# Patient Record
Sex: FEMALE | Race: WHITE | Employment: OTHER | URBAN - METROPOLITAN AREA
[De-identification: names, ages, dates, MRNs, and addresses within clinical notes are randomized per-mention and may not be internally consistent; named-entity substitution may affect disease eponyms.]

---

## 2021-05-14 NOTE — PROGRESS NOTES
Emile Segovia is a 79year old female who presents for     Establish care  Changing from Dr. Katerine Crawford at [facility] and wanted a doctor in [place].  Referred by another patient here–Rosalinda HARRY.     At present [place] [address] with [place] syndrome [address]   • Knee cartilage, torn, left 2020    saw Dr Cyndee Lucio. Did PT. • Left leg DVT ([address] 2014    treated w Xarelto x 1yr, then aspirin --Dr Rocío Mccauley and Dr Nerissa Camejo at [facility]   • Osteoarthritis    • Osteoporosis 2015    dx at [facility].  Calcium, Vit D. P COPD   • Hypertension Father    • Endocrine Disorder Mother         flip's disease dx'd in 57   • Other (fall) Mother          age 80.     • Other (wegener's disease) Brother    • Other (1 brother, 2 sons, 2 daughters) Other    • Other (brain tumo TSH. Pt then would like refill sent. Hypercholesterolemia-- on 12/16/20 in [place]. Check lipids. Pt following diet. Pt notes 2 statins in the past caused muscle pain. Consider crestor or zetia. Bruit L carotid--check carotid dopplers.    - chart-everywhere). Ret in 2 mo. Patient expresses understanding of above issues and plan.    Spent 95 min reviewing chart, history, exam, reviewing assessment/ plan, orders and completing documentation.    - CBC WITH DIFFERENTIAL WITH PLATELET; Futu

## 2021-05-17 ENCOUNTER — OFFICE VISIT (OUTPATIENT)
Dept: INTERNAL MEDICINE CLINIC | Facility: CLINIC | Age: 81
End: 2021-05-17
Payer: MEDICARE

## 2021-05-17 VITALS
HEART RATE: 72 BPM | HEIGHT: 61.5 IN | DIASTOLIC BLOOD PRESSURE: 66 MMHG | WEIGHT: 117 LBS | TEMPERATURE: 98 F | SYSTOLIC BLOOD PRESSURE: 138 MMHG | BODY MASS INDEX: 21.81 KG/M2

## 2021-05-17 DIAGNOSIS — Z85.3 HISTORY OF RIGHT BREAST CANCER: ICD-10-CM

## 2021-05-17 DIAGNOSIS — K58.9 IRRITABLE BOWEL SYNDROME, UNSPECIFIED TYPE: ICD-10-CM

## 2021-05-17 DIAGNOSIS — K21.9 GASTROESOPHAGEAL REFLUX DISEASE WITHOUT ESOPHAGITIS: ICD-10-CM

## 2021-05-17 DIAGNOSIS — Z76.89 ENCOUNTER TO ESTABLISH CARE: Primary | ICD-10-CM

## 2021-05-17 DIAGNOSIS — K76.0 FATTY LIVER: ICD-10-CM

## 2021-05-17 DIAGNOSIS — R09.89 BRUIT OF LEFT CAROTID ARTERY: ICD-10-CM

## 2021-05-17 DIAGNOSIS — E78.00 HYPERCHOLESTEROLEMIA: ICD-10-CM

## 2021-05-17 DIAGNOSIS — Z86.718 HISTORY OF DVT OF LOWER EXTREMITY: ICD-10-CM

## 2021-05-17 DIAGNOSIS — E55.9 VITAMIN D DEFICIENCY: ICD-10-CM

## 2021-05-17 DIAGNOSIS — E03.9 HYPOTHYROIDISM, UNSPECIFIED TYPE: ICD-10-CM

## 2021-05-17 PROBLEM — Z87.09 HISTORY OF ASTHMA: Status: ACTIVE | Noted: 2021-05-17

## 2021-05-17 PROCEDURE — G2212 PROLONG OUTPT/OFFICE VIS: HCPCS | Performed by: INTERNAL MEDICINE

## 2021-05-17 PROCEDURE — 99205 OFFICE O/P NEW HI 60 MIN: CPT | Performed by: INTERNAL MEDICINE

## 2021-05-17 RX ORDER — ASCORBIC ACID 250 MG
TABLET,CHEWABLE ORAL
Qty: 90 TABLET | Refills: 0 | COMMUNITY
Start: 2021-05-17 | End: 2021-06-02

## 2021-05-17 RX ORDER — ASPIRIN 325 MG
325 TABLET ORAL DAILY
Refills: 0 | COMMUNITY
Start: 2021-05-17 | End: 2021-06-02

## 2021-05-17 RX ORDER — OMEGA-3 FATTY ACIDS/FISH OIL 300-1000MG
CAPSULE ORAL DAILY
Qty: 90 CAPSULE | Refills: 0 | COMMUNITY
Start: 2021-05-17 | End: 2021-06-02

## 2021-05-17 RX ORDER — LEVOTHYROXINE SODIUM 0.07 MG/1
75 TABLET ORAL
Refills: 0 | COMMUNITY
Start: 2021-05-17 | End: 2021-05-19

## 2021-05-17 RX ORDER — HYDROCHLOROTHIAZIDE 25 MG/1
25 TABLET ORAL DAILY
Refills: 0 | COMMUNITY
Start: 2021-05-17 | End: 2021-05-19 | Stop reason: ALTCHOICE

## 2021-05-17 NOTE — PATIENT INSTRUCTIONS
Do ultrasound of carotid arteries (dopplers). Fasting lab tests. Please get records from your prior PCP.

## 2021-05-18 ENCOUNTER — LAB ENCOUNTER (OUTPATIENT)
Dept: LAB | Facility: HOSPITAL | Age: 81
End: 2021-05-18
Attending: INTERNAL MEDICINE
Payer: MEDICARE

## 2021-05-18 ENCOUNTER — TELEPHONE (OUTPATIENT)
Dept: INTERNAL MEDICINE CLINIC | Facility: CLINIC | Age: 81
End: 2021-05-18

## 2021-05-18 DIAGNOSIS — E78.00 HYPERCHOLESTEROLEMIA: ICD-10-CM

## 2021-05-18 DIAGNOSIS — E55.9 VITAMIN D DEFICIENCY: ICD-10-CM

## 2021-05-18 PROCEDURE — 80053 COMPREHEN METABOLIC PANEL: CPT

## 2021-05-18 PROCEDURE — 85025 COMPLETE CBC W/AUTO DIFF WBC: CPT

## 2021-05-18 PROCEDURE — 82306 VITAMIN D 25 HYDROXY: CPT

## 2021-05-18 PROCEDURE — 36415 COLL VENOUS BLD VENIPUNCTURE: CPT

## 2021-05-18 PROCEDURE — 84443 ASSAY THYROID STIM HORMONE: CPT

## 2021-05-18 PROCEDURE — 80061 LIPID PANEL: CPT

## 2021-05-18 NOTE — TELEPHONE ENCOUNTER
Patient stopped in today and signed the completed release of information form left at the .      Release of Information was then faxed to the requested medical records number for [facility] at [phone]    Con

## 2021-05-18 NOTE — TELEPHONE ENCOUNTER
Called patient who will come into office and sign PHI form for [place] [id number] records. Unable to access via [facility]. Left form at -pt will sign this afternoon.

## 2021-05-19 ENCOUNTER — TELEPHONE (OUTPATIENT)
Dept: INTERNAL MEDICINE CLINIC | Facility: CLINIC | Age: 81
End: 2021-05-19

## 2021-05-19 DIAGNOSIS — E87.6 LOW SERUM POTASSIUM: Primary | ICD-10-CM

## 2021-05-19 RX ORDER — TRIAMTERENE AND HYDROCHLOROTHIAZIDE 37.5; 25 MG/1; MG/1
1 CAPSULE ORAL DAILY
Qty: 90 CAPSULE | Refills: 3 | Status: SHIPPED | OUTPATIENT
Start: 2021-05-19 | End: 2021-05-25

## 2021-05-19 RX ORDER — LEVOTHYROXINE SODIUM 0.07 MG/1
75 TABLET ORAL
Qty: 90 TABLET | Refills: 3 | Status: SHIPPED | OUTPATIENT
Start: 2021-05-19

## 2021-05-19 NOTE — TELEPHONE ENCOUNTER
To nursing,   please tell patient lab results are overall okay except     1) potassium level is a little low at 3.2.   Change hydrochlorothiazide 25 mg daily to an equivalent medication that is potassium sparing substitute called triamterene–hydrochlorothia

## 2021-05-19 NOTE — TELEPHONE ENCOUNTER
To nursing, please tell patient vitamin D level is on the high side at 78.9. In this range, she does not need vitamin D supplements and can stop vitamin D that she takes. Thanks.

## 2021-05-24 ENCOUNTER — PATIENT MESSAGE (OUTPATIENT)
Dept: INTERNAL MEDICINE CLINIC | Facility: CLINIC | Age: 81
End: 2021-05-24

## 2021-05-25 ENCOUNTER — TELEPHONE (OUTPATIENT)
Dept: INTERNAL MEDICINE CLINIC | Facility: CLINIC | Age: 81
End: 2021-05-25

## 2021-05-25 RX ORDER — POTASSIUM CHLORIDE 750 MG/1
10 TABLET, FILM COATED, EXTENDED RELEASE ORAL DAILY
Qty: 90 TABLET | Refills: 3 | Status: SHIPPED | OUTPATIENT
Start: 2021-05-25 | End: 2021-06-02

## 2021-05-25 RX ORDER — HYDROCHLOROTHIAZIDE 25 MG/1
25 TABLET ORAL DAILY
Qty: 90 TABLET | Refills: 3 | Status: SHIPPED | OUTPATIENT
Start: 2021-05-25 | End: 2021-06-02

## 2021-05-25 NOTE — TELEPHONE ENCOUNTER
----- Message from Trang Maldonado sent at 5/24/2021  6:39 PM CDT -----  Regarding: Prescription Question  Contact: [phone]   at my recent new patient visit to you a new daily prescription was begun,   triamiterene 37.5mg/[id number], instead of the com

## 2021-05-25 NOTE — TELEPHONE ENCOUNTER
To Dr. Renee Delgadillo - see below. Spoke with pt who confirmed message below, said she is discouraged as she has had difficult problems/reactions with BP meds in the past.  Did not realized this has BP reducing component.   She does understand this was changed

## 2021-05-25 NOTE — TELEPHONE ENCOUNTER
To nursing, please tell patient:  1) stop triamterene–HCTZ since she is not tolerating it. 2) go back to hydrochlorothiazide 25 mg daily.   3) since potassium level is low (K3.2 on 5/18/21), take a small dose of potassium along with the hydrochlorothiazide

## 2021-05-25 NOTE — TELEPHONE ENCOUNTER
Spoke to patient and relayed MD message and instructions, patient verbalizes understanding and agrees with plan. Pended rx's sent to patient pharmacy. She denies any questions at this time.

## 2021-05-28 ENCOUNTER — HOSPITAL ENCOUNTER (OUTPATIENT)
Dept: ULTRASOUND IMAGING | Facility: HOSPITAL | Age: 81
Discharge: HOME | End: 2021-05-28
Attending: INTERNAL MEDICINE
Payer: MEDICARE

## 2021-05-28 ENCOUNTER — TELEPHONE (OUTPATIENT)
Dept: INTERNAL MEDICINE CLINIC | Facility: CLINIC | Age: 81
End: 2021-05-28

## 2021-05-28 DIAGNOSIS — R09.89 BRUIT OF LEFT CAROTID ARTERY: ICD-10-CM

## 2021-05-28 DIAGNOSIS — I49.9 CARDIAC ARRHYTHMIA, UNSPECIFIED CARDIAC ARRHYTHMIA TYPE: Primary | ICD-10-CM

## 2021-05-28 PROCEDURE — 93880 EXTRACRANIAL BILAT STUDY: CPT | Performed by: INTERNAL MEDICINE

## 2021-05-28 NOTE — TELEPHONE ENCOUNTER
To nursing, please tell pt personally. 5/28/21--carotid dopplers--results show no significant narrowing. Incidentally the radiologist \"note is made of cardiac arrhythmia\". This is not specified what it is.  I recom she check EKG--order entered--she ca

## 2021-06-02 ENCOUNTER — OFFICE VISIT (OUTPATIENT)
Dept: INTERNAL MEDICINE CLINIC | Facility: CLINIC | Age: 81
End: 2021-06-02
Payer: MEDICARE

## 2021-06-02 VITALS
HEART RATE: 60 BPM | DIASTOLIC BLOOD PRESSURE: 68 MMHG | WEIGHT: 109 LBS | TEMPERATURE: 98 F | BODY MASS INDEX: 20.32 KG/M2 | HEIGHT: 61.5 IN | SYSTOLIC BLOOD PRESSURE: 148 MMHG

## 2021-06-02 DIAGNOSIS — E87.6 LOW SERUM POTASSIUM: ICD-10-CM

## 2021-06-02 DIAGNOSIS — I49.3 PVC'S (PREMATURE VENTRICULAR CONTRACTIONS): ICD-10-CM

## 2021-06-02 DIAGNOSIS — K58.9 IRRITABLE BOWEL SYNDROME, UNSPECIFIED TYPE: Primary | ICD-10-CM

## 2021-06-02 DIAGNOSIS — I49.9 CARDIAC ARRHYTHMIA, UNSPECIFIED CARDIAC ARRHYTHMIA TYPE: ICD-10-CM

## 2021-06-02 PROCEDURE — 99215 OFFICE O/P EST HI 40 MIN: CPT | Performed by: INTERNAL MEDICINE

## 2021-06-02 PROCEDURE — 93000 ELECTROCARDIOGRAM COMPLETE: CPT | Performed by: INTERNAL MEDICINE

## 2021-06-02 RX ORDER — WHEAT DEXTRIN 3 G/3.8 G
POWDER (GRAM) ORAL AS NEEDED
Refills: 0 | COMMUNITY
Start: 2021-06-02

## 2021-06-02 NOTE — PATIENT INSTRUCTIONS
See GI specialist--Dr Cohen//Clare CHUN. Add benefiber 1 tsp daily in juice or water. Stop hydrochlorothiazide. Check lab test in 1-2 wks--potassium and magnesium levels.    Do echocardiogram.

## 2021-06-02 NOTE — PROGRESS NOTES
Marisel Das is a 79year old female who presents for     GI issues:  \"I've had them for years. As I get older, they take longer to get over. \"  Onset 1 wk ago, \"gas pain, explosion of feces, mushy every morning. \"  One stool a day--\"once I expel it in with erosions, punctate ulcers prepyloric area and duodenal bulb   • GERD (gastroesophageal reflux disease)    • Hypercholesterolemia    • Hypothyroidism 2000 or before   • Irritable bowel syndrome 2000   • Knee cartilage, torn, left 2020    saw Dr Laisha Zaldivar (H pylori negative), esophagitis      Family History   Problem Relation Age of Onset   • Heart Disease Father         coronary artery disease-- age 80   • Lung Disorder Father         COPD   • Hypertension Father    • Endocrine Disorder Mother Await response to benefiber --and see GI. Of note--in old records:  CT abd pelvis 5/20/20 at [facility]-no acute abn. Hepatic cysts and 12 mm focus in R lobe liver.    MRI  Liver w w/o contrast 6/13/20-[facility]--focal steatosis account for CT abn. consderable atroph Dr Justine Presley. Hx DVT L leg--in 2014--took xarelto for 1 yr. Now took  mg daily until 6/2/21 visit. Was taking hydrochlorothiazide 25 mg daily for past swelling. K3.2 on 5/18/21–gave trial Dyazide instead of HCTZ.   Patient did not tolerate dya

## 2021-06-17 ENCOUNTER — HOSPITAL ENCOUNTER (OUTPATIENT)
Dept: CV DIAGNOSTICS | Age: 81
Discharge: HOME | End: 2021-06-17
Attending: INTERNAL MEDICINE
Payer: MEDICARE

## 2021-06-17 DIAGNOSIS — I49.3 PVC'S (PREMATURE VENTRICULAR CONTRACTIONS): ICD-10-CM

## 2021-06-17 PROCEDURE — 93306 TTE W/DOPPLER COMPLETE: CPT | Performed by: INTERNAL MEDICINE

## 2021-06-18 ENCOUNTER — LAB ENCOUNTER (OUTPATIENT)
Dept: LAB | Facility: HOSPITAL | Age: 81
End: 2021-06-18
Attending: INTERNAL MEDICINE
Payer: MEDICARE

## 2021-06-18 ENCOUNTER — TELEPHONE (OUTPATIENT)
Dept: INTERNAL MEDICINE CLINIC | Facility: CLINIC | Age: 81
End: 2021-06-18

## 2021-06-18 DIAGNOSIS — I49.3 PVC'S (PREMATURE VENTRICULAR CONTRACTIONS): ICD-10-CM

## 2021-06-18 DIAGNOSIS — E87.6 LOW SERUM POTASSIUM: ICD-10-CM

## 2021-06-18 PROCEDURE — 84132 ASSAY OF SERUM POTASSIUM: CPT

## 2021-06-18 PROCEDURE — 36415 COLL VENOUS BLD VENIPUNCTURE: CPT

## 2021-06-18 PROCEDURE — 83735 ASSAY OF MAGNESIUM: CPT

## 2021-06-18 NOTE — TELEPHONE ENCOUNTER
I sent patient results note email:  Philly Boss, your magnesium and potassium levels done 3/18/21 are normal.  Please see me 7/19/21 as planned. Dr Stephanie Rodríguez. Note to self–K3.6, mag 2.3 on 6/18/21.

## 2021-07-16 NOTE — PROGRESS NOTES
Farida Peñaloza is a 79year old female who presents for     Check at 1.5 months–    GI issues f/u  \"I discontinued the aspirin 325 mg I was taking for prevention of blood clot.   As time has gone on, the pain (abd) has resolved and irritation from foods is normal.   • Wrist fracture last 2019    R wrist fracture \"five times\"--casted Dr Loulou Cai      Past Surgical History:   Procedure Laterality Date   • CATARACT EXTRACTION Bilateral 2014    Dr Encarnacion Opitz   • [facility]  [facility] 28 [facility]   • Ulcerative Colitis Daughter       Social History:   Social History    Tobacco Use      Smoking status: Never Smoker      Smokeless tobacco: Never Used    Alcohol use: Yes      Comment: 1-2 drinks a month    Drug use: Not on consderable atrophy pancreas. Colonoscopy 10/20/14–Dr. Deon Vasquez–diverticulosis, volodymyr polyp, random colon bx's normal–next at 6 yrs. he ref for pelvic floor therapy for fecal incontinence-pt noted at 5/17/21 visit that this resolved.  She declines furt this resolved. She declines further cscopies. Mammogram–11/2020 in [place]--Dr De León. Was ok per pt. Dexa--hx osteoporosis on dexa--pt declines rx, therefore forego dexa. Johnnie Felder.   Vaccines–Tdap 5/9/12, Pneumovax–12/2020 in [place] [address]

## 2021-07-19 ENCOUNTER — OFFICE VISIT (OUTPATIENT)
Dept: INTERNAL MEDICINE CLINIC | Facility: CLINIC | Age: 81
End: 2021-07-19
Payer: MEDICARE

## 2021-07-19 VITALS
HEART RATE: 72 BPM | HEIGHT: 61.5 IN | DIASTOLIC BLOOD PRESSURE: 70 MMHG | WEIGHT: 119 LBS | SYSTOLIC BLOOD PRESSURE: 130 MMHG | TEMPERATURE: 98 F | BODY MASS INDEX: 22.18 KG/M2

## 2021-07-19 DIAGNOSIS — I49.3 PVC'S (PREMATURE VENTRICULAR CONTRACTIONS): ICD-10-CM

## 2021-07-19 DIAGNOSIS — K29.70 GASTRITIS WITHOUT BLEEDING, UNSPECIFIED CHRONICITY, UNSPECIFIED GASTRITIS TYPE: Primary | ICD-10-CM

## 2021-07-19 DIAGNOSIS — K58.9 IRRITABLE BOWEL SYNDROME, UNSPECIFIED TYPE: ICD-10-CM

## 2021-07-19 DIAGNOSIS — I83.93 ASYMPTOMATIC VARICOSE VEINS OF BOTH LOWER EXTREMITIES: ICD-10-CM

## 2021-07-19 PROCEDURE — 99214 OFFICE O/P EST MOD 30 MIN: CPT | Performed by: INTERNAL MEDICINE

## 2021-07-19 RX ORDER — ASPIRIN 81 MG/1
81 TABLET ORAL DAILY
Qty: 1 TABLET | Refills: 0 | COMMUNITY
Start: 2021-07-19 | End: 2021-08-09

## 2021-08-02 ENCOUNTER — MED REC SCAN ONLY (OUTPATIENT)
Dept: INTERNAL MEDICINE CLINIC | Facility: CLINIC | Age: 81
End: 2021-08-02

## 2021-08-04 ENCOUNTER — APPOINTMENT (OUTPATIENT)
Dept: ULTRASOUND IMAGING | Facility: HOSPITAL | Age: 81
End: 2021-08-04
Payer: MEDICARE

## 2021-08-04 ENCOUNTER — HOSPITAL ENCOUNTER (EMERGENCY)
Facility: HOSPITAL | Age: 81
Discharge: HOME | End: 2021-08-04
Payer: MEDICARE

## 2021-08-04 VITALS
RESPIRATION RATE: 18 BRPM | OXYGEN SATURATION: 98 % | TEMPERATURE: 98 F | SYSTOLIC BLOOD PRESSURE: 177 MMHG | HEART RATE: 78 BPM | DIASTOLIC BLOOD PRESSURE: 62 MMHG

## 2021-08-04 DIAGNOSIS — M79.604 RIGHT LEG PAIN: Primary | ICD-10-CM

## 2021-08-04 PROCEDURE — 93971 EXTREMITY STUDY: CPT

## 2021-08-04 PROCEDURE — 99284 EMERGENCY DEPT VISIT MOD MDM: CPT

## 2021-08-04 NOTE — ED INITIAL ASSESSMENT (HPI)
The patient who reports a left leg DVT about 6 years ago states that she has stopped taking 325 mg daily aspirin about 2.5 months ago due to abdominal discomfort and now reports right upper thigh to femoral leg pain that is similar in presentation to her l

## 2021-08-04 NOTE — ED PROVIDER NOTES
Patient Seen in: [facility] AND [facility] Emergency Department      History   Patient presents with:  Leg Pain    Stated Complaint: possible dvt     HPI/Subjective:   HPI    Patient is a 20-year-old female that complains of right thigh pain.   She states she had Smith--diverticulosis, hemorrhoids, polyp--lost, recommended repeat colon 5 years   • COLONOSCOPY,BIOPSY N/A 10/20/2014    Procedure: ESOPHAGOGASTRODUODENOSCOPY, COLONOSCOPY, POSSIBLE BIOPSY, POSSIBLE POLYPECTOMY 19100,15026;  Surgeon: Xavi Lopes MD; Physical Exam    Constitutional: Oriented to person, place, and time. Appears well-developed and well-nourished. HEENT:   Head: Normocephalic and atraumatic.    Right Ear: External ear normal.   Left Ear: External ear normal.   Nose: Nose normal. days      We recommend that you schedule follow up care with a primary care provider within the next three months to obtain basic health screening including reassessment of your blood pressure.       Medications Prescribed:  Discharge Medication List as of

## 2021-08-09 ENCOUNTER — TELEPHONE (OUTPATIENT)
Dept: INTERNAL MEDICINE CLINIC | Facility: CLINIC | Age: 81
End: 2021-08-09

## 2021-08-09 ENCOUNTER — OFFICE VISIT (OUTPATIENT)
Dept: INTERNAL MEDICINE CLINIC | Facility: CLINIC | Age: 81
End: 2021-08-09
Payer: MEDICARE

## 2021-08-09 VITALS
OXYGEN SATURATION: 98 % | HEIGHT: 61.5 IN | BODY MASS INDEX: 22.03 KG/M2 | WEIGHT: 118.19 LBS | HEART RATE: 61 BPM | DIASTOLIC BLOOD PRESSURE: 66 MMHG | TEMPERATURE: 98 F | SYSTOLIC BLOOD PRESSURE: 136 MMHG

## 2021-08-09 DIAGNOSIS — M79.651 RIGHT THIGH PAIN: Primary | ICD-10-CM

## 2021-08-09 DIAGNOSIS — I49.9 CARDIAC ARRHYTHMIA, UNSPECIFIED CARDIAC ARRHYTHMIA TYPE: ICD-10-CM

## 2021-08-09 PROCEDURE — 99213 OFFICE O/P EST LOW 20 MIN: CPT | Performed by: INTERNAL MEDICINE

## 2021-08-09 PROCEDURE — 93000 ELECTROCARDIOGRAM COMPLETE: CPT | Performed by: INTERNAL MEDICINE

## 2021-08-09 NOTE — PROGRESS NOTES
Saeed Hinkle is a 79year old female who presents for     Seen in ER 8/4/21 with R thigh pain. \"I woke up b/c of it at 1 in the morning with sharp pain (in the R anterior thigh) and I couldn't get back to sleep.  I thought it was b/c I had been in one p ESOPHAGOGASTRODUODENOSCOPY, COLONOSCOPY, POSSIBLE BIOPSY, POSSIBLE POLYPECTOMY 19900,79160;  Surgeon: Xavi Lopes MD;  Location: [address] [place] [address]   • LUMPECTOMY RIGHT  2014    Dr Richey Enter Cross R*    HIVES           EXAM:   /66   Pulse 61   Temp 98.3 °F (36.8 °C)   Ht 5' 1.5\" (1.562 m)   Wt 118 lb 3.2 oz (53.6 kg)   SpO2 98%   BMI 21.97 kg/m²       Wt Readings from Last 6 Encounters:  08/09/21 : 118 lb 3.2 oz (53.6 kg)  07/19/21 : 11 note--in old records:  CT abd pelvis 5/20/20 at [facility]-no acute abn. Hepatic cysts and 12 mm focus in R lobe liver. MRI  Liver w w/o contrast 6/13/20-[facility]--focal steatosis account for CT abn. consderable atrophy pancreas.    Colonoscopy 10/20/14–Dr. Manuel Brownlee at 6 yrs. he ref for pelvic floor therapy for fecal incontinence-pt notes at 5/17/21 visit that this resolved. She declines further cscopies. Mammo–11/2020 in [place]--Dr Ward. Was ok per pt.    Dexa--hx osteoporosis on dexa--pt declines rx, therefore for

## 2022-03-31 ENCOUNTER — LAB ENCOUNTER (OUTPATIENT)
Dept: LAB | Facility: HOSPITAL | Age: 82
End: 2022-03-31
Attending: INTERNAL MEDICINE
Payer: MEDICARE

## 2022-03-31 DIAGNOSIS — E03.9 ACQUIRED HYPOTHYROIDISM: ICD-10-CM

## 2022-03-31 DIAGNOSIS — E78.00 PURE HYPERCHOLESTEROLEMIA: ICD-10-CM

## 2022-03-31 DIAGNOSIS — I51.9 MYXEDEMA HEART DISEASE: ICD-10-CM

## 2022-03-31 DIAGNOSIS — E03.9 MYXEDEMA HEART DISEASE: ICD-10-CM

## 2022-03-31 DIAGNOSIS — R07.89 OTHER CHEST PAIN: ICD-10-CM

## 2022-03-31 DIAGNOSIS — Z86.718 PERSONAL HISTORY OF VENOUS THROMBOSIS AND EMBOLISM: Primary | ICD-10-CM

## 2022-03-31 LAB
ALBUMIN SERPL-MCNC: 3.5 G/DL (ref 3.4–5)
ALBUMIN/GLOB SERPL: 1 {RATIO} (ref 1–2)
ALP LIVER SERPL-CCNC: 63 U/L
ALT SERPL-CCNC: 24 U/L
ANION GAP SERPL CALC-SCNC: 7 MMOL/L (ref 0–18)
AST SERPL-CCNC: 23 U/L (ref 15–37)
BASOPHILS # BLD AUTO: 0.05 X10(3) UL (ref 0–0.2)
BASOPHILS NFR BLD AUTO: 1 %
BILIRUB SERPL-MCNC: 1.3 MG/DL (ref 0.1–2)
BUN BLD-MCNC: 24 MG/DL (ref 7–18)
CALCIUM BLD-MCNC: 9.6 MG/DL (ref 8.5–10.1)
CHLORIDE SERPL-SCNC: 109 MMOL/L (ref 98–112)
CHOLEST SERPL-MCNC: 248 MG/DL (ref ?–200)
CO2 SERPL-SCNC: 27 MMOL/L (ref 21–32)
CREAT BLD-MCNC: 1.07 MG/DL
DEPRECATED RDW RBC AUTO: 44.5 FL (ref 35.1–46.3)
EOSINOPHIL # BLD AUTO: 0.1 X10(3) UL (ref 0–0.7)
EOSINOPHIL NFR BLD AUTO: 2.1 %
ERYTHROCYTE [DISTWIDTH] IN BLOOD BY AUTOMATED COUNT: 13 % (ref 11–15)
FASTING PATIENT LIPID ANSWER: YES
GLOBULIN PLAS-MCNC: 3.5 G/DL (ref 2.8–4.4)
GLUCOSE BLD-MCNC: 88 MG/DL (ref 70–99)
HCT VFR BLD AUTO: 48.4 %
HDLC SERPL-MCNC: 69 MG/DL (ref 40–59)
HGB BLD-MCNC: 15.8 G/DL
IMM GRANULOCYTES # BLD AUTO: 0.01 X10(3) UL (ref 0–1)
IMM GRANULOCYTES NFR BLD: 0.2 %
LDLC SERPL CALC-MCNC: 160 MG/DL (ref ?–100)
LYMPHOCYTES # BLD AUTO: 2 X10(3) UL (ref 1–4)
LYMPHOCYTES NFR BLD AUTO: 41.1 %
MCH RBC QN AUTO: 30.4 PG (ref 26–34)
MCHC RBC AUTO-ENTMCNC: 32.6 G/DL (ref 31–37)
MCV RBC AUTO: 93.1 FL
MONOCYTES # BLD AUTO: 0.55 X10(3) UL (ref 0.1–1)
MONOCYTES NFR BLD AUTO: 11.3 %
NEUTROPHILS # BLD AUTO: 2.16 X10 (3) UL (ref 1.5–7.7)
NEUTROPHILS # BLD AUTO: 2.16 X10(3) UL (ref 1.5–7.7)
NEUTROPHILS NFR BLD AUTO: 44.3 %
NONHDLC SERPL-MCNC: 179 MG/DL (ref ?–130)
OSMOLALITY SERPL CALC.SUM OF ELEC: 299 MOSM/KG (ref 275–295)
PLATELET # BLD AUTO: 167 10(3)UL (ref 150–450)
POTASSIUM SERPL-SCNC: 4 MMOL/L (ref 3.5–5.1)
PROT SERPL-MCNC: 7 G/DL (ref 6.4–8.2)
RBC # BLD AUTO: 5.2 X10(6)UL
SODIUM SERPL-SCNC: 143 MMOL/L (ref 136–145)
T4 FREE SERPL-MCNC: 1.5 NG/DL (ref 0.8–1.7)
TRIGL SERPL-MCNC: 108 MG/DL (ref 30–149)
TSI SER-ACNC: 4.07 MIU/ML (ref 0.36–3.74)
VLDLC SERPL CALC-MCNC: 21 MG/DL (ref 0–30)
WBC # BLD AUTO: 4.9 X10(3) UL (ref 4–11)

## 2022-03-31 PROCEDURE — 80053 COMPREHEN METABOLIC PANEL: CPT

## 2022-03-31 PROCEDURE — 84443 ASSAY THYROID STIM HORMONE: CPT

## 2022-03-31 PROCEDURE — 85025 COMPLETE CBC W/AUTO DIFF WBC: CPT

## 2022-03-31 PROCEDURE — 80061 LIPID PANEL: CPT

## 2022-03-31 PROCEDURE — 36415 COLL VENOUS BLD VENIPUNCTURE: CPT

## 2022-03-31 PROCEDURE — 84439 ASSAY OF FREE THYROXINE: CPT

## 2022-04-01 ENCOUNTER — OFFICE VISIT (OUTPATIENT)
Dept: INTERNAL MEDICINE CLINIC | Facility: CLINIC | Age: 82
End: 2022-04-01
Payer: MEDICARE

## 2022-04-01 VITALS
DIASTOLIC BLOOD PRESSURE: 68 MMHG | WEIGHT: 121 LBS | SYSTOLIC BLOOD PRESSURE: 138 MMHG | HEART RATE: 56 BPM | TEMPERATURE: 98 F | HEIGHT: 61.5 IN | BODY MASS INDEX: 22.55 KG/M2

## 2022-04-01 DIAGNOSIS — K21.9 GASTROESOPHAGEAL REFLUX DISEASE WITHOUT ESOPHAGITIS: ICD-10-CM

## 2022-04-01 DIAGNOSIS — E78.00 HYPERCHOLESTEROLEMIA: ICD-10-CM

## 2022-04-01 DIAGNOSIS — I83.93 ASYMPTOMATIC VARICOSE VEINS OF BOTH LOWER EXTREMITIES: ICD-10-CM

## 2022-04-01 DIAGNOSIS — E03.9 HYPOTHYROIDISM, UNSPECIFIED TYPE: ICD-10-CM

## 2022-04-01 DIAGNOSIS — K58.9 IRRITABLE BOWEL SYNDROME, UNSPECIFIED TYPE: ICD-10-CM

## 2022-04-01 DIAGNOSIS — Z00.00 MEDICARE ANNUAL WELLNESS VISIT, SUBSEQUENT: Primary | ICD-10-CM

## 2022-04-01 PROCEDURE — 99214 OFFICE O/P EST MOD 30 MIN: CPT | Performed by: INTERNAL MEDICINE

## 2022-04-01 PROCEDURE — G0439 PPPS, SUBSEQ VISIT: HCPCS | Performed by: INTERNAL MEDICINE

## 2022-04-01 RX ORDER — LEVOTHYROXINE SODIUM 0.07 MG/1
75 TABLET ORAL
Qty: 90 TABLET | Refills: 3 | Status: SHIPPED | OUTPATIENT
Start: 2022-04-01

## 2022-04-01 NOTE — PATIENT INSTRUCTIONS
4th covid shot is recommended. Please fill out the power of  of healthcare form and bring a copy here.

## 2022-04-07 ENCOUNTER — IMMUNIZATION (OUTPATIENT)
Dept: LAB | Age: 82
End: 2022-04-07
Attending: EMERGENCY MEDICINE
Payer: MEDICARE

## 2022-04-07 DIAGNOSIS — Z23 NEED FOR VACCINATION: Primary | ICD-10-CM

## 2022-04-07 PROCEDURE — 0054A SARSCOV2 VAC 30MCG TRS SUCR: CPT

## 2022-05-20 ENCOUNTER — OFFICE VISIT (OUTPATIENT)
Dept: INTERNAL MEDICINE CLINIC | Facility: CLINIC | Age: 82
End: 2022-05-20
Payer: MEDICARE

## 2022-05-20 ENCOUNTER — TELEPHONE (OUTPATIENT)
Dept: INTERNAL MEDICINE CLINIC | Facility: CLINIC | Age: 82
End: 2022-05-20

## 2022-05-20 VITALS
DIASTOLIC BLOOD PRESSURE: 60 MMHG | OXYGEN SATURATION: 95 % | BODY MASS INDEX: 22.92 KG/M2 | WEIGHT: 123 LBS | SYSTOLIC BLOOD PRESSURE: 138 MMHG | HEIGHT: 61.5 IN | TEMPERATURE: 98 F | HEART RATE: 72 BPM

## 2022-05-20 DIAGNOSIS — Z01.812 PRE-PROCEDURE LAB EXAM: ICD-10-CM

## 2022-05-20 DIAGNOSIS — R06.00 DOE (DYSPNEA ON EXERTION): ICD-10-CM

## 2022-05-20 DIAGNOSIS — J45.20 MILD INTERMITTENT ASTHMA WITHOUT COMPLICATION: Primary | ICD-10-CM

## 2022-05-20 RX ORDER — LEVALBUTEROL TARTRATE 45 UG/1
1-2 AEROSOL, METERED ORAL EVERY 4 HOURS PRN
Qty: 1 EACH | Refills: 5 | Status: SHIPPED | OUTPATIENT
Start: 2022-05-20

## 2022-05-20 NOTE — TELEPHONE ENCOUNTER
To nursing, please tell patient   Dr. Rebeca Lebron office faxed the ambulatory heart monitor report from April 2022. Results showed 1 episode of atrial fibrillation and some premature beats called PVCs --17% of her beats. Keep her appointment with Dr. Roberta Miranda next month as planned. In the meantime, ask her to check with Dr. Rebeca Lebron office/his nurse if they recommend any specific medicine changes due to the 1 episode of atrial fibrillation.   Thanks

## 2022-05-23 ENCOUNTER — LAB ENCOUNTER (OUTPATIENT)
Dept: LAB | Facility: HOSPITAL | Age: 82
End: 2022-05-23
Attending: INTERNAL MEDICINE
Payer: MEDICARE

## 2022-05-23 DIAGNOSIS — Z01.812 PRE-PROCEDURE LAB EXAM: ICD-10-CM

## 2022-05-23 LAB — SARS-COV-2 RNA RESP QL NAA+PROBE: NOT DETECTED

## 2022-05-25 ENCOUNTER — TELEPHONE (OUTPATIENT)
Dept: INTERNAL MEDICINE CLINIC | Facility: CLINIC | Age: 82
End: 2022-05-25

## 2022-05-26 ENCOUNTER — TELEPHONE (OUTPATIENT)
Dept: INTERNAL MEDICINE CLINIC | Facility: CLINIC | Age: 82
End: 2022-05-26

## 2022-05-26 ENCOUNTER — HOSPITAL ENCOUNTER (OUTPATIENT)
Dept: CV DIAGNOSTICS | Facility: HOSPITAL | Age: 82
Discharge: HOME | End: 2022-05-26
Attending: INTERNAL MEDICINE
Payer: MEDICARE

## 2022-05-26 ENCOUNTER — HOSPITAL ENCOUNTER (OUTPATIENT)
Dept: RESPIRATORY THERAPY | Facility: HOSPITAL | Age: 82
Discharge: HOME | End: 2022-05-26
Attending: INTERNAL MEDICINE
Payer: MEDICARE

## 2022-05-26 ENCOUNTER — HOSPITAL ENCOUNTER (OUTPATIENT)
Dept: GENERAL RADIOLOGY | Facility: HOSPITAL | Age: 82
Discharge: HOME | End: 2022-05-26
Attending: INTERNAL MEDICINE
Payer: MEDICARE

## 2022-05-26 DIAGNOSIS — R07.89 CHEST TIGHTNESS: ICD-10-CM

## 2022-05-26 DIAGNOSIS — E78.00 HYPERCHOLESTEROLEMIA: ICD-10-CM

## 2022-05-26 DIAGNOSIS — R06.00 DOE (DYSPNEA ON EXERTION): ICD-10-CM

## 2022-05-26 DIAGNOSIS — E03.9 HYPOTHYROIDISM: ICD-10-CM

## 2022-05-26 DIAGNOSIS — J45.20 MILD INTERMITTENT ASTHMA WITHOUT COMPLICATION: ICD-10-CM

## 2022-05-26 DIAGNOSIS — Z86.718 HISTORY OF DVT (DEEP VEIN THROMBOSIS): ICD-10-CM

## 2022-05-26 PROCEDURE — 93306 TTE W/DOPPLER COMPLETE: CPT | Performed by: INTERNAL MEDICINE

## 2022-05-26 PROCEDURE — 94060 EVALUATION OF WHEEZING: CPT

## 2022-05-26 PROCEDURE — 94729 DIFFUSING CAPACITY: CPT

## 2022-05-26 PROCEDURE — 94726 PLETHYSMOGRAPHY LUNG VOLUMES: CPT

## 2022-05-26 PROCEDURE — 71046 X-RAY EXAM CHEST 2 VIEWS: CPT | Performed by: INTERNAL MEDICINE

## 2022-06-09 ENCOUNTER — PATIENT MESSAGE (OUTPATIENT)
Dept: INTERNAL MEDICINE CLINIC | Facility: CLINIC | Age: 82
End: 2022-06-09

## 2022-06-09 NOTE — TELEPHONE ENCOUNTER
From: Rachid Moser  Sent: 6/9/2022 9:00 AM CDT  To: [facility] Clinical Staff  Subject: Results    Thank you

## 2022-06-10 ENCOUNTER — HOSPITAL ENCOUNTER (EMERGENCY)
Facility: HOSPITAL | Age: 82
Discharge: HOME | End: 2022-06-10
Attending: EMERGENCY MEDICINE
Payer: MEDICARE

## 2022-06-10 ENCOUNTER — APPOINTMENT (OUTPATIENT)
Dept: GENERAL RADIOLOGY | Facility: HOSPITAL | Age: 82
End: 2022-06-10
Attending: EMERGENCY MEDICINE
Payer: MEDICARE

## 2022-06-10 VITALS
RESPIRATION RATE: 16 BRPM | TEMPERATURE: 98 F | BODY MASS INDEX: 22.66 KG/M2 | SYSTOLIC BLOOD PRESSURE: 151 MMHG | WEIGHT: 120 LBS | HEART RATE: 89 BPM | DIASTOLIC BLOOD PRESSURE: 63 MMHG | OXYGEN SATURATION: 96 % | HEIGHT: 61 IN

## 2022-06-10 DIAGNOSIS — R07.89 CHEST TIGHTNESS: ICD-10-CM

## 2022-06-10 DIAGNOSIS — K52.9 GASTROENTERITIS: Primary | ICD-10-CM

## 2022-06-10 LAB
ALBUMIN SERPL-MCNC: 3.6 G/DL (ref 3.4–5)
ALBUMIN/GLOB SERPL: 1 {RATIO} (ref 1–2)
ALP LIVER SERPL-CCNC: 65 U/L
ALT SERPL-CCNC: 20 U/L
ANION GAP SERPL CALC-SCNC: 11 MMOL/L (ref 0–18)
ANION GAP SERPL CALC-SCNC: 9 MMOL/L (ref 0–18)
AST SERPL-CCNC: 23 U/L (ref 15–37)
BASOPHILS # BLD AUTO: 0.03 X10(3) UL (ref 0–0.2)
BASOPHILS NFR BLD AUTO: 0.3 %
BILIRUB SERPL-MCNC: 0.5 MG/DL (ref 0.1–2)
BUN BLD-MCNC: 32 MG/DL (ref 7–18)
BUN BLD-MCNC: 34 MG/DL (ref 7–18)
BUN/CREAT SERPL: 30.2 (ref 10–20)
BUN/CREAT SERPL: 33.3 (ref 10–20)
CALCIUM BLD-MCNC: 9.6 MG/DL (ref 8.5–10.1)
CALCIUM BLD-MCNC: 9.7 MG/DL (ref 8.5–10.1)
CHLORIDE SERPL-SCNC: 108 MMOL/L (ref 98–112)
CHLORIDE SERPL-SCNC: 108 MMOL/L (ref 98–112)
CO2 SERPL-SCNC: 24 MMOL/L (ref 21–32)
CO2 SERPL-SCNC: 25 MMOL/L (ref 21–32)
CREAT BLD-MCNC: 1.02 MG/DL
CREAT BLD-MCNC: 1.06 MG/DL
DEPRECATED RDW RBC AUTO: 44.1 FL (ref 35.1–46.3)
EOSINOPHIL # BLD AUTO: 0.09 X10(3) UL (ref 0–0.7)
EOSINOPHIL NFR BLD AUTO: 0.8 %
ERYTHROCYTE [DISTWIDTH] IN BLOOD BY AUTOMATED COUNT: 13.2 % (ref 11–15)
GLOBULIN PLAS-MCNC: 3.7 G/DL (ref 2.8–4.4)
GLUCOSE BLD-MCNC: 96 MG/DL (ref 70–99)
GLUCOSE BLD-MCNC: 98 MG/DL (ref 70–99)
HCT VFR BLD AUTO: 45.9 %
HGB BLD-MCNC: 15 G/DL
IMM GRANULOCYTES # BLD AUTO: 0.04 X10(3) UL (ref 0–1)
IMM GRANULOCYTES NFR BLD: 0.4 %
LYMPHOCYTES # BLD AUTO: 1.43 X10(3) UL (ref 1–4)
LYMPHOCYTES NFR BLD AUTO: 13.4 %
MCH RBC QN AUTO: 29.9 PG (ref 26–34)
MCHC RBC AUTO-ENTMCNC: 32.7 G/DL (ref 31–37)
MCV RBC AUTO: 91.4 FL
MONOCYTES # BLD AUTO: 0.7 X10(3) UL (ref 0.1–1)
MONOCYTES NFR BLD AUTO: 6.6 %
NEUTROPHILS # BLD AUTO: 8.37 X10 (3) UL (ref 1.5–7.7)
NEUTROPHILS # BLD AUTO: 8.37 X10(3) UL (ref 1.5–7.7)
NEUTROPHILS NFR BLD AUTO: 78.5 %
OSMOLALITY SERPL CALC.SUM OF ELEC: 301 MOSM/KG (ref 275–295)
OSMOLALITY SERPL CALC.SUM OF ELEC: 304 MOSM/KG (ref 275–295)
PLATELET # BLD AUTO: 175 10(3)UL (ref 150–450)
POTASSIUM SERPL-SCNC: 3.7 MMOL/L (ref 3.5–5.1)
POTASSIUM SERPL-SCNC: 3.8 MMOL/L (ref 3.5–5.1)
PROT SERPL-MCNC: 7.3 G/DL (ref 6.4–8.2)
RBC # BLD AUTO: 5.02 X10(6)UL
SARS-COV-2 RNA RESP QL NAA+PROBE: NOT DETECTED
SODIUM SERPL-SCNC: 142 MMOL/L (ref 136–145)
SODIUM SERPL-SCNC: 143 MMOL/L (ref 136–145)
TROPONIN I HIGH SENSITIVITY: 4 NG/L
WBC # BLD AUTO: 10.7 X10(3) UL (ref 4–11)

## 2022-06-10 PROCEDURE — 96360 HYDRATION IV INFUSION INIT: CPT

## 2022-06-10 PROCEDURE — 71045 X-RAY EXAM CHEST 1 VIEW: CPT | Performed by: EMERGENCY MEDICINE

## 2022-06-10 PROCEDURE — 93010 ELECTROCARDIOGRAM REPORT: CPT | Performed by: EMERGENCY MEDICINE

## 2022-06-10 PROCEDURE — 85025 COMPLETE CBC W/AUTO DIFF WBC: CPT

## 2022-06-10 PROCEDURE — 80053 COMPREHEN METABOLIC PANEL: CPT | Performed by: EMERGENCY MEDICINE

## 2022-06-10 PROCEDURE — 93005 ELECTROCARDIOGRAM TRACING: CPT

## 2022-06-10 PROCEDURE — 84484 ASSAY OF TROPONIN QUANT: CPT | Performed by: EMERGENCY MEDICINE

## 2022-06-10 PROCEDURE — 80048 BASIC METABOLIC PNL TOTAL CA: CPT | Performed by: EMERGENCY MEDICINE

## 2022-06-10 PROCEDURE — 85025 COMPLETE CBC W/AUTO DIFF WBC: CPT | Performed by: EMERGENCY MEDICINE

## 2022-06-10 PROCEDURE — 99285 EMERGENCY DEPT VISIT HI MDM: CPT

## 2022-06-10 RX ORDER — ONDANSETRON 4 MG/1
4 TABLET, ORALLY DISINTEGRATING ORAL EVERY 4 HOURS PRN
Qty: 10 TABLET | Refills: 0 | Status: SHIPPED | OUTPATIENT
Start: 2022-06-10 | End: 2022-06-17

## 2022-06-10 RX ORDER — ALBUTEROL SULFATE 2.5 MG/3ML
2.5 SOLUTION RESPIRATORY (INHALATION) ONCE
Status: DISCONTINUED | OUTPATIENT
Start: 2022-06-10 | End: 2022-06-10

## 2022-06-10 RX ORDER — CLOPIDOGREL BISULFATE 75 MG/1
75 TABLET ORAL DAILY
COMMUNITY

## 2022-06-10 NOTE — ED INITIAL ASSESSMENT (HPI)
States that she started to have chest tightness associated with N/V/D at 0400. No C/O shortness of breath.  No C/O fever

## 2022-06-10 NOTE — ED QUICK NOTES
Pt A&Ox4, discharge paperwork, prescription, and follow-up discussed with pt, pt verbally understands them. Pt discharged ambulatory with steady gait - no distress noted.

## 2022-06-13 ENCOUNTER — OFFICE VISIT (OUTPATIENT)
Dept: INTERNAL MEDICINE CLINIC | Facility: CLINIC | Age: 82
End: 2022-06-13
Payer: MEDICARE

## 2022-06-13 VITALS
HEIGHT: 61.5 IN | HEART RATE: 52 BPM | OXYGEN SATURATION: 97 % | BODY MASS INDEX: 22.92 KG/M2 | DIASTOLIC BLOOD PRESSURE: 65 MMHG | WEIGHT: 123 LBS | SYSTOLIC BLOOD PRESSURE: 134 MMHG | TEMPERATURE: 98 F

## 2022-06-13 DIAGNOSIS — K21.9 GASTROESOPHAGEAL REFLUX DISEASE WITHOUT ESOPHAGITIS: ICD-10-CM

## 2022-06-13 DIAGNOSIS — I49.9 CARDIAC ARRHYTHMIA, UNSPECIFIED CARDIAC ARRHYTHMIA TYPE: ICD-10-CM

## 2022-06-13 DIAGNOSIS — E78.00 HYPERCHOLESTEROLEMIA: ICD-10-CM

## 2022-06-13 DIAGNOSIS — J45.20 MILD INTERMITTENT ASTHMA WITHOUT COMPLICATION: ICD-10-CM

## 2022-06-13 DIAGNOSIS — K52.9 GASTROENTERITIS: Primary | ICD-10-CM

## 2022-06-13 PROCEDURE — 99214 OFFICE O/P EST MOD 30 MIN: CPT | Performed by: INTERNAL MEDICINE

## 2022-06-13 PROCEDURE — 1126F AMNT PAIN NOTED NONE PRSNT: CPT | Performed by: INTERNAL MEDICINE

## 2022-06-13 RX ORDER — MULTIVITAMIN
1 TABLET ORAL DAILY
COMMUNITY

## 2022-06-13 RX ORDER — ORGAN CONCENTRATES 80 MG
CAPSULE ORAL
COMMUNITY

## 2022-06-13 NOTE — H&P
[facility]    Cardiac Electrophysiology H&P Addendum    [facility]   [facility] [id number] MRN [id number]   Admission Date 6/20/2022 Procedure Date 6/20/2022   Attending Physician Jan Degroot MD Procedure Physician Wilfrid Reed MD       H&P ADDENDUM    I personally reviewed the attached H&P on 6/20/2022, and no significant changes have occurred in the patient's condition since the H&P was performed. Risks, alternatives, and benefits of procedure were again reviewed at bedside with the patient. They have no additional questions and wish to proceed. Wilfrid Reed M.D.    Cardiac Electrophysiology       -----------------------------------------

## 2022-06-17 ENCOUNTER — NURSE ONLY (OUTPATIENT)
Dept: LAB | Facility: HOSPITAL | Age: 82
End: 2022-06-17
Attending: INTERNAL MEDICINE
Payer: MEDICARE

## 2022-06-17 DIAGNOSIS — Z01.818 PRE-OP TESTING: ICD-10-CM

## 2022-06-17 LAB
MRSA DNA SPEC QL NAA+PROBE: NEGATIVE
SARS-COV-2 RNA RESP QL NAA+PROBE: NOT DETECTED

## 2022-06-17 PROCEDURE — 87641 MR-STAPH DNA AMP PROBE: CPT

## 2022-06-20 ENCOUNTER — HOSPITAL ENCOUNTER (OUTPATIENT)
Dept: GENERAL RADIOLOGY | Facility: HOSPITAL | Age: 82
Discharge: HOME | End: 2022-06-20
Attending: INTERNAL MEDICINE | Admitting: INTERNAL MEDICINE
Payer: MEDICARE

## 2022-06-20 ENCOUNTER — HOSPITAL ENCOUNTER (OUTPATIENT)
Dept: INTERVENTIONAL RADIOLOGY/VASCULAR | Facility: HOSPITAL | Age: 82
Discharge: HOME | End: 2022-06-20
Attending: INTERNAL MEDICINE | Admitting: INTERNAL MEDICINE
Payer: MEDICARE

## 2022-06-20 VITALS
WEIGHT: 118 LBS | HEART RATE: 67 BPM | TEMPERATURE: 98 F | OXYGEN SATURATION: 99 % | DIASTOLIC BLOOD PRESSURE: 48 MMHG | HEIGHT: 61.5 IN | BODY MASS INDEX: 21.99 KG/M2 | RESPIRATION RATE: 24 BRPM | SYSTOLIC BLOOD PRESSURE: 128 MMHG

## 2022-06-20 DIAGNOSIS — I49.5 SINUS NODE DYSFUNCTION (HCC): ICD-10-CM

## 2022-06-20 DIAGNOSIS — R00.1 SINUS BRADYCARDIA: ICD-10-CM

## 2022-06-20 DIAGNOSIS — R42 LIGHTHEADEDNESS: ICD-10-CM

## 2022-06-20 DIAGNOSIS — Z01.818 PRE-OP TESTING: Primary | ICD-10-CM

## 2022-06-20 DIAGNOSIS — R06.00 EXERTIONAL DYSPNEA: ICD-10-CM

## 2022-06-20 PROCEDURE — 33208 INSRT HEART PM ATRIAL & VENT: CPT

## 2022-06-20 PROCEDURE — 99153 MOD SED SAME PHYS/QHP EA: CPT

## 2022-06-20 PROCEDURE — 0JH606Z INSERTION OF PACEMAKER, DUAL CHAMBER INTO CHEST SUBCUTANEOUS TISSUE AND FASCIA, OPEN APPROACH: ICD-10-PCS | Performed by: INTERNAL MEDICINE

## 2022-06-20 PROCEDURE — 02H63JZ INSERTION OF PACEMAKER LEAD INTO RIGHT ATRIUM, PERCUTANEOUS APPROACH: ICD-10-PCS | Performed by: INTERNAL MEDICINE

## 2022-06-20 PROCEDURE — 99152 MOD SED SAME PHYS/QHP 5/>YRS: CPT

## 2022-06-20 PROCEDURE — 02HK3JZ INSERTION OF PACEMAKER LEAD INTO RIGHT VENTRICLE, PERCUTANEOUS APPROACH: ICD-10-PCS | Performed by: INTERNAL MEDICINE

## 2022-06-20 PROCEDURE — 36415 COLL VENOUS BLD VENIPUNCTURE: CPT

## 2022-06-20 PROCEDURE — 71045 X-RAY EXAM CHEST 1 VIEW: CPT | Performed by: INTERNAL MEDICINE

## 2022-06-20 RX ORDER — LIDOCAINE HYDROCHLORIDE 20 MG/ML
INJECTION, SOLUTION INFILTRATION; PERINEURAL
Status: COMPLETED
Start: 2022-06-20 | End: 2022-06-20

## 2022-06-20 RX ORDER — MIDAZOLAM HYDROCHLORIDE 1 MG/ML
INJECTION INTRAMUSCULAR; INTRAVENOUS
Status: COMPLETED
Start: 2022-06-20 | End: 2022-06-20

## 2022-06-20 RX ORDER — SODIUM CHLORIDE 9 MG/ML
INJECTION, SOLUTION INTRAVENOUS
Status: COMPLETED | OUTPATIENT
Start: 2022-06-20 | End: 2022-06-20

## 2022-06-20 RX ORDER — 0.9 % SODIUM CHLORIDE 0.9 %
INTRAVENOUS SOLUTION INTRAVENOUS
Status: COMPLETED
Start: 2022-06-20 | End: 2022-06-20

## 2022-06-20 RX ORDER — VANCOMYCIN HYDROCHLORIDE 1 G/20ML
INJECTION, POWDER, LYOPHILIZED, FOR SOLUTION INTRAVENOUS
Status: COMPLETED
Start: 2022-06-20 | End: 2022-06-20

## 2022-06-20 RX ORDER — ACETAMINOPHEN AND CODEINE PHOSPHATE 300; 30 MG/1; MG/1
2 TABLET ORAL EVERY 4 HOURS PRN
Status: DISCONTINUED | OUTPATIENT
Start: 2022-06-20 | End: 2022-06-20

## 2022-06-20 RX ORDER — ACETAMINOPHEN 325 MG/1
650 TABLET ORAL EVERY 4 HOURS PRN
Status: DISCONTINUED | OUTPATIENT
Start: 2022-06-20 | End: 2022-06-20

## 2022-06-20 RX ORDER — ACETAMINOPHEN AND CODEINE PHOSPHATE 300; 30 MG/1; MG/1
1 TABLET ORAL EVERY 4 HOURS PRN
Status: DISCONTINUED | OUTPATIENT
Start: 2022-06-20 | End: 2022-06-20

## 2022-06-20 RX ADMIN — SODIUM CHLORIDE: 9 INJECTION, SOLUTION INTRAVENOUS at 08:45:00

## 2022-06-20 RX ADMIN — 0.9 % SODIUM CHLORIDE: 0.9 % INTRAVENOUS SOLUTION INTRAVENOUS at 08:45:00

## 2022-06-20 NOTE — IVS NOTE
DISCHARGE NOTE     Pt is able to sit up and ambulate without difficulty. Pt voided and tolerated fluids  Procedural site remains dry and intact   No signs and symptoms of bleeding/hematoma noted. Left arm sling in place   IV access removed  Instruction provided, patient/family verbalizes understanding. Dr. Kenia Sahni spoke with patient and family via phone post procedure.      Pt discharge via wheelchair to [facility]     Follow up Appointment:     New Prescription:

## 2022-07-01 RX ORDER — LEVOTHYROXINE SODIUM 0.07 MG/1
TABLET ORAL
Qty: 90 TABLET | Refills: 3 | OUTPATIENT
Start: 2022-07-01

## 2022-07-13 ENCOUNTER — OFFICE VISIT (OUTPATIENT)
Dept: GASTROENTEROLOGY | Facility: CLINIC | Age: 82
End: 2022-07-13
Payer: MEDICARE

## 2022-07-13 ENCOUNTER — HOSPITAL ENCOUNTER (OUTPATIENT)
Dept: GENERAL RADIOLOGY | Facility: HOSPITAL | Age: 82
Discharge: HOME | End: 2022-07-13
Attending: INTERNAL MEDICINE
Payer: MEDICARE

## 2022-07-13 VITALS
BODY MASS INDEX: 22.59 KG/M2 | WEIGHT: 121.19 LBS | DIASTOLIC BLOOD PRESSURE: 74 MMHG | HEART RATE: 64 BPM | SYSTOLIC BLOOD PRESSURE: 132 MMHG | HEIGHT: 61.5 IN

## 2022-07-13 DIAGNOSIS — K58.8 OTHER IRRITABLE BOWEL SYNDROME: ICD-10-CM

## 2022-07-13 DIAGNOSIS — R19.5 ABNORMAL STOOLS: ICD-10-CM

## 2022-07-13 DIAGNOSIS — R19.5 ABNORMAL STOOLS: Primary | ICD-10-CM

## 2022-07-13 DIAGNOSIS — K21.9 GASTROESOPHAGEAL REFLUX DISEASE WITHOUT ESOPHAGITIS: ICD-10-CM

## 2022-07-13 PROCEDURE — 74018 RADEX ABDOMEN 1 VIEW: CPT | Performed by: INTERNAL MEDICINE

## 2022-07-13 PROCEDURE — 99204 OFFICE O/P NEW MOD 45 MIN: CPT | Performed by: INTERNAL MEDICINE

## 2022-07-13 PROCEDURE — 1111F DSCHRG MED/CURRENT MED MERGE: CPT | Performed by: INTERNAL MEDICINE

## 2022-07-13 NOTE — PATIENT INSTRUCTIONS
1. Abnormal stools  2. Gastroesophageal reflux disease without esophagitis  3. Other irritable bowel syndrome      Recommend:  Here are some reflux precautions:   - Avoid trigger foods  - Anti-reflux measures: raising the head of the bed at night, avoiding tight clothing or belts, avoiding eating late at night and not lying down shortly after mealtime and achieving weight loss   - Avoid NSAID's, caffeine, peppermints, alcohol, tobacco and foods that incite symptoms     X ray to see stool burden  Continue pelvic floor strengthening  Stool test for infection  Low FODMAP diet  Ok to take imodium to help with incontinence and diarrhea but monitor, if no stool one day may be too much.  Do not want to get constipation or fecal impaction

## 2022-07-14 ENCOUNTER — TELEPHONE (OUTPATIENT)
Dept: GASTROENTEROLOGY | Facility: CLINIC | Age: 82
End: 2022-07-14

## 2022-07-14 NOTE — TELEPHONE ENCOUNTER
Instructed to obtain mag citrate 10 oz bottle which is the typical solution quantity available over the counter. May purchase at any "[employer]" or [employer]. If doesn't provide improvement to contact office for next steps in constipation therapy.

## 2022-07-14 NOTE — TELEPHONE ENCOUNTER
Patient is calling to respond to the [employer] message about her abdomen xray. Patient would like to know what magnesium citrate is. Please call.

## 2024-04-05 NOTE — OPERATIVE REPORT
[facility]    Cardiac Electrophysiology Operative Note    Kaleb Maldonado Location:Cath Lab Suites   [facility] [id number] MRN [id number]   Admission Date 6/20/2022 Procedure Date 6/20/2022   Attending Physician Javier Dumont MD Procedure Physician Heraclio Haprer MD       Preprocedure Diagnosis:  Sinus node dysfunction     Postprocedure Diagnosis:  Sinus node dysfunction    Procedures:    - Implantation of a MRI-conditional dual-chamber permanent pacemaker, right atrial lead, and right ventricular lead [facility]  - Fluoroscopy   - Venography   - Moderate conscious sedation     :  Heraclio Harper M.D. Anesthesia:  I provided moderate conscious sedation from 09:29 to 10:01. Versed, fentanyl. Estimated blood loss:  10 mL      Complications:  None apparent. Findings:  The patient was brought to the operating room in the postabsorptive and stable state. A procedural pause was performed to confirm the patient's identity and the site and type of surgery. Intravenous conscious sedation was administered. The chest was prepped and draped in a sterile fashion. 1% lidocaine was administered in the left chest, and an incision was made medial to the deltopectoral groove. Electrocautery was used to create a small inferomedially directed prepectoral pocket. The left axillary vein was accessed using venography, fluoroscopy, and the modified Seldinger technique. Two J-tipped guide wires were advanced into the inferior vena cava. Over the first wire, a 6-[place] citizen sheath was advanced into the axillary vein, and through this the right ventricular lead was advanced to the right ventricular septum, confirmed in the Yoruba and PARDO fluoroscopic perspectives. Lead stability and electrical parameters were satisfactory, and the lead was secured to the pectoralis muscle using 3 separate ties of 0-ethibond suture and the collar.   The next wire was used to advance a 6-[place] citizen sheath into the axillary LOV 10/9/23 - Ok to proceed with PA?  Thank you.   vein, through which the right atrial lead was advanced to the right atrial appendage. Lead stability and electrical parameters were satisfactory, and the lead was secured to the pectoralis muscle using 3 separate ties of 0-ethibond suture and the collar. 10-volt output from both leads produced no extracardiac stimulation. The generator was connected to the leads, with visual inspection and gentle tugging confirming a secure connection. The device pocket was irrigated with sterile solution, and hemostasis was excellent. The device was placed within tthe pocket, and it was closed with a deep layer of 2-0 Vicryl suture, an intermediate layer of 2-0 Vicryl suture, and the subcuticular layer was approximated with a Zipline device, with a sterile bandage placed over the site. The pacemaker was interrogated via the external , with satisfactory findings. The patient was transported to the recovery area in comfortable and stable status. Device Settings:   DDDR  ppm         RESULTS:   - Implantation of a MRI-conditional dual-chamber pacemaker      PLAN:   - Portable CXR in recovery  - Remote device interrogation in AM  - No heparin, lovenox, or other anticocagulants for 48 hours. - Incision care as directed. Keep dry for 5 days.  - Follow-up in [address] [place] [address] on June 27.   - See me on July 19 to remove Zipline.  - Arm restrictions for 3 months, with sling at night for 1 month. - Driving/activity restrictions as instructed. Hipolito Perdue M.D.    Cardiac Electrophysiology     6/20/2022   -----------------------------------------